# Patient Record
Sex: MALE | Race: WHITE | Employment: FULL TIME | URBAN - METROPOLITAN AREA
[De-identification: names, ages, dates, MRNs, and addresses within clinical notes are randomized per-mention and may not be internally consistent; named-entity substitution may affect disease eponyms.]

---

## 2020-09-11 ENCOUNTER — TRANSFERRED RECORDS (OUTPATIENT)
Dept: HEALTH INFORMATION MANAGEMENT | Facility: CLINIC | Age: 50
End: 2020-09-11

## 2020-09-11 LAB
CHOLEST SERPL-MCNC: 187 MG/DL (ref 135–200)
CREAT SERPL-MCNC: 1.34 MG/DL (ref 0.6–1.3)
GLUCOSE SERPL-MCNC: 136 MG/DL (ref 70–100)
HBA1C MFR BLD: 6.1 % (ref 3.8–5.6)
HDLC SERPL-MCNC: 40 MG/DL (ref 40–60)
LDLC SERPL CALC-MCNC: 116 MG/DL (ref 0–100)
POTASSIUM SERPL-SCNC: 3.8 MEQ/L (ref 3.5–5.1)
TRIGL SERPL-MCNC: 155 MG/DL (ref 0–150)

## 2020-09-29 ENCOUNTER — VIRTUAL VISIT (OUTPATIENT)
Dept: GASTROENTEROLOGY | Facility: CLINIC | Age: 50
End: 2020-09-29
Payer: COMMERCIAL

## 2020-09-29 DIAGNOSIS — K62.5 RECTAL BLEEDING: ICD-10-CM

## 2020-09-29 DIAGNOSIS — K59.00 CONSTIPATION, UNSPECIFIED CONSTIPATION TYPE: Primary | ICD-10-CM

## 2020-09-29 PROCEDURE — 99203 OFFICE O/P NEW LOW 30 MIN: CPT | Mod: 95 | Performed by: INTERNAL MEDICINE

## 2020-09-29 RX ORDER — NICOTINE POLACRILEX 4 MG/1
20 GUM, CHEWING ORAL DAILY
COMMUNITY

## 2020-09-29 RX ORDER — ZINC SULFATE 50(220)MG
220 CAPSULE ORAL DAILY
COMMUNITY

## 2020-09-29 RX ORDER — RIBOFLAVIN (VITAMIN B2) 100 MG
100 TABLET ORAL 3 TIMES DAILY
COMMUNITY

## 2020-09-29 RX ORDER — MULTIPLE VITAMINS W/ MINERALS TAB 9MG-400MCG
1 TAB ORAL DAILY
COMMUNITY

## 2020-09-29 RX ORDER — NAPROXEN 500 MG/1
500 TABLET ORAL 2 TIMES DAILY WITH MEALS
COMMUNITY

## 2020-09-29 RX ORDER — MONTELUKAST SODIUM 10 MG/1
10 TABLET ORAL AT BEDTIME
COMMUNITY

## 2020-09-29 NOTE — PROGRESS NOTES
"Ren Ruiz is a 49 year old male who is being evaluated via a billable telephone visit.      The patient has been notified of following:     \"This telephone visit will be conducted via a call between you and your physician/provider. We have found that certain health care needs can be provided without the need for a physical exam.  This service lets us provide the care you need with a short phone conversation.  If a prescription is necessary we can send it directly to your pharmacy.  If lab work is needed we can place an order for that and you can then stop by our lab to have the test done at a later time.    Telephone visits are billed at different rates depending on your insurance coverage. During this emergency period, for some insurers they may be billed the same as an in-person visit.  Please reach out to your insurance provider with any questions.    If during the course of the call the physician/provider feels a telephone visit is not appropriate, you will not be charged for this service.\"    Patient has given verbal consent for Telephone visit?  Yes    What phone number would you like to be contacted at? [phone]    Wife is conferencing in as well.     How would you like to obtain your AVS? Mail a copy    Phone call duration: 30 minutes    Nilson Titus MD      "

## 2020-09-29 NOTE — PROGRESS NOTES
"Visit Date:   09/29/2020      REQUESTING PHYSICIAN:  Dr. Dmitry Arias      REASON FOR CONSULTATION:  Change in bowel habit, rectal bleeding.      HISTORY OF PRESENT ILLNESS:  The patient is a 49-year-old man who previously lived and worked in the [place] area, but now lives in [place], [place], near the [place].  Over the past 10 years, the patient has struggled with bowel irregularity.  He states he has to drink coffee every day to effect a good bowel movement on a daily basis.  Without the benefit of coffee, he states 2 or 3 days might pass before a bowel movement would occur.  By that time, he would be uncomfortable with bloating, distention, a \"hard stomach,\" and difficult flatulence.  The flatulence also takes on an unpleasant sulfurous smell.  He also reports that he is rather sensitive to laxatives.  Even a single standard dose of MiraLax can provoke diarrhea.  Thus, he has found it difficult to modulate his bowel habit.  He has also noticed on occasion a small amount of bright red blood per rectum.  He has occasional abdominal cramping.  He denies nausea or vomiting, heartburn, dysphagia, anorexia or weight loss.  He denies fevers, sweats or chills.      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      The patient reports having had 2 colonoscopies, 1 in 2010 and another in 2012.  Apparently, there was a question of some type of \"colitis,\" though details are uncertain.  The patient did mention that his grandfather had ulcerative colitis.  To the best of his knowledge, there is no family history of colon cancer.      PAST MEDICAL HISTORY:  Asthma and seasonal allergies.      CURRENT MEDICATIONS:  Advair Diskus, Singulair, multivitamin, omeprazole, vitamin C and zinc supplements, " "Flonase, albuterol inhaler, ibuprofen.      MEDICATION ALLERGIES:  NONE.      FAMILY HISTORY:  Both parents are in good health; father age 70, mother age 68.  As mentioned, 1 grandfather had ulcerative colitis.  There is no family history of colorectal cancer.      SOCIAL HISTORY:  The patient is .  His wife is a nurse.  The patient does not smoke or drink to excess.  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      PHYSICAL EXAMINATION:  No exam was done today as this was a virtual visit done during the COVID pandemic.      ASSESSMENT:  Tendency towards constipation and rectal bleeding.  The latter is perhaps due to hemorrhoids, but given the past question of some type of \"colitis,\" that remains within the differential diagnosis.  I suspect the patient's episodes of constipation are leading to overgrowth of colon bacteria with noxious flatulence as a consequence.      PLAN:   1.  Discussed diagnostic considerations.   2.  Recommend proceeding with colonoscopy.  Discussed the technique, indications, risks, benefits, complications, etc., and the need for COVID testing.  This will be arranged accordingly.   3.  Further recommendations will follow.         MAHAD GROSS MD             D: 2020   T: 2020   MT: DB      Name:     SPENCER HESS   MRN:      [id number]        Account:      [id number]   :      1970           Visit Date:   2020      Document: [id number]       cc: Dmitry Arias MD   "

## 2020-09-30 ENCOUNTER — TELEPHONE (OUTPATIENT)
Dept: GASTROENTEROLOGY | Facility: CLINIC | Age: 50
End: 2020-09-30

## 2020-09-30 DIAGNOSIS — Z11.59 ENCOUNTER FOR SCREENING FOR OTHER VIRAL DISEASES: Primary | ICD-10-CM

## 2020-09-30 PROBLEM — K59.00 CONSTIPATION, UNSPECIFIED CONSTIPATION TYPE: Status: ACTIVE | Noted: 2020-09-30

## 2020-09-30 PROBLEM — K62.5 RECTAL BLEEDING: Status: ACTIVE | Noted: 2020-09-30

## 2020-09-30 NOTE — LETTER
[facility]  [address]  [address] FLOOR  [facility] [id number]  [phone]        September 30, 2020    Ren Ruiz  [address] [facility] [place] [address]

## 2020-09-30 NOTE — TELEPHONE ENCOUNTER
Date of colonoscopy/EGD: 10/21  Surgeon: Dr. Titus  Prep:Miralax  Packet:Colonoscopy/EGD instructions mailed to patient's home address.   Date: 9/30/2020      Surgery Scheduler

## 2020-09-30 NOTE — TELEPHONE ENCOUNTER
Left message for patient to return call to schedule EGD/colonoscopy. If Louisa or Sarah are not available, please transfer to same day surgery

## 2020-09-30 NOTE — LETTER

## 2020-09-30 NOTE — TELEPHONE ENCOUNTER
[place] of [place]. He will having his covid test done in [place]. He was given the OR [employer] Fax # to send those results to.

## 2020-10-21 ENCOUNTER — SURGERY (OUTPATIENT)
Age: 50
End: 2020-10-21
Payer: COMMERCIAL

## 2020-10-21 ENCOUNTER — HOSPITAL ENCOUNTER (OUTPATIENT)
Facility: CLINIC | Age: 50
Discharge: HOME | End: 2020-10-21
Attending: INTERNAL MEDICINE | Admitting: INTERNAL MEDICINE
Payer: COMMERCIAL

## 2020-10-21 VITALS
DIASTOLIC BLOOD PRESSURE: 70 MMHG | RESPIRATION RATE: 16 BRPM | HEART RATE: 63 BPM | SYSTOLIC BLOOD PRESSURE: 107 MMHG | OXYGEN SATURATION: 96 % | TEMPERATURE: 98 F

## 2020-10-21 DIAGNOSIS — K59.00 CONSTIPATION, UNSPECIFIED CONSTIPATION TYPE: ICD-10-CM

## 2020-10-21 DIAGNOSIS — K62.5 RECTAL BLEEDING: ICD-10-CM

## 2020-10-21 LAB — COLONOSCOPY: NORMAL

## 2020-10-21 PROCEDURE — 250N000009 HC RX 250: Performed by: INTERNAL MEDICINE

## 2020-10-21 PROCEDURE — G0500 MOD SEDAT ENDO SERVICE >5YRS: HCPCS | Performed by: INTERNAL MEDICINE

## 2020-10-21 PROCEDURE — 45378 DIAGNOSTIC COLONOSCOPY: CPT | Performed by: INTERNAL MEDICINE

## 2020-10-21 PROCEDURE — 250N000011 HC RX IP 250 OP 636: Performed by: INTERNAL MEDICINE

## 2020-10-21 RX ORDER — ONDANSETRON 2 MG/ML
4 INJECTION INTRAMUSCULAR; INTRAVENOUS
Status: DISCONTINUED | OUTPATIENT
Start: 2020-10-21 | End: 2020-10-21 | Stop reason: HOSPADM

## 2020-10-21 RX ORDER — LIDOCAINE 40 MG/G
CREAM TOPICAL
Status: DISCONTINUED | OUTPATIENT
Start: 2020-10-21 | End: 2020-10-21 | Stop reason: HOSPADM

## 2020-10-21 RX ORDER — FLUMAZENIL 0.1 MG/ML
0.2 INJECTION, SOLUTION INTRAVENOUS
Status: DISCONTINUED | OUTPATIENT
Start: 2020-10-21 | End: 2020-10-21 | Stop reason: HOSPADM

## 2020-10-21 RX ORDER — FENTANYL CITRATE 50 UG/ML
INJECTION, SOLUTION INTRAMUSCULAR; INTRAVENOUS PRN
Status: DISCONTINUED | OUTPATIENT
Start: 2020-10-21 | End: 2020-10-21 | Stop reason: HOSPADM

## 2020-10-21 RX ORDER — ONDANSETRON 4 MG/1
4 TABLET, ORALLY DISINTEGRATING ORAL EVERY 6 HOURS PRN
Status: DISCONTINUED | OUTPATIENT
Start: 2020-10-21 | End: 2020-10-21 | Stop reason: HOSPADM

## 2020-10-21 RX ORDER — NALOXONE HYDROCHLORIDE 0.4 MG/ML
.1-.4 INJECTION, SOLUTION INTRAMUSCULAR; INTRAVENOUS; SUBCUTANEOUS
Status: DISCONTINUED | OUTPATIENT
Start: 2020-10-21 | End: 2020-10-21 | Stop reason: HOSPADM

## 2020-10-21 RX ORDER — PROCHLORPERAZINE MALEATE 5 MG
10 TABLET ORAL EVERY 6 HOURS PRN
Status: DISCONTINUED | OUTPATIENT
Start: 2020-10-21 | End: 2020-10-21 | Stop reason: HOSPADM

## 2020-10-21 RX ORDER — ONDANSETRON 2 MG/ML
4 INJECTION INTRAMUSCULAR; INTRAVENOUS EVERY 6 HOURS PRN
Status: DISCONTINUED | OUTPATIENT
Start: 2020-10-21 | End: 2020-10-21 | Stop reason: HOSPADM

## 2020-10-21 RX ADMIN — LIDOCAINE HYDROCHLORIDE 1 ML: 10 INJECTION, SOLUTION EPIDURAL; INFILTRATION; INTRACAUDAL; PERINEURAL at 09:33

## 2020-10-21 RX ADMIN — MIDAZOLAM 2 MG: 1 INJECTION INTRAMUSCULAR; INTRAVENOUS at 10:11

## 2020-10-21 RX ADMIN — MIDAZOLAM 1 MG: 1 INJECTION INTRAMUSCULAR; INTRAVENOUS at 10:10

## 2020-10-21 RX ADMIN — FENTANYL CITRATE 50 MCG: 50 INJECTION, SOLUTION INTRAMUSCULAR; INTRAVENOUS at 10:09

## 2020-10-21 RX ADMIN — MIDAZOLAM 2 MG: 1 INJECTION INTRAMUSCULAR; INTRAVENOUS at 10:09

## 2020-10-21 NOTE — CONSULTS
[facility] GI Pre-Procedure Physical Assessment    Ren Ruiz MRN# [id number]   Age: 49 year old YOB: 1970      Date of Surgery: 10/21/2020  Location [facility]      Date of Exam 10/21/2020 Facility (Same day)       Home clinic: [facility]  Primary care provider: Ad Ragsdale         Active problem list:   Patient Active Problem List   Diagnosis     Mild intermittent asthma     Allergic rhinitis due to other allergen     Polyp of nasal cavity     Joint pain     Tobacco use disorder     Constipation, unspecified constipation type     Rectal bleeding            Medications (include herbals and vitamins):   Any Plavix use in the last 7 days?  No     Current Facility-Administered Medications   Medication     lidocaine (LMX4) kit     lidocaine 1 % 0.1-1 mL     ondansetron (ZOFRAN) injection 4 mg     sodium chloride (PF) 0.9% PF flush 3 mL     sodium chloride (PF) 0.9% PF flush 3 mL             Allergies:      Allergies   Allergen Reactions     Animal Dander      Mold      Nuts      Ragweeds      Allergy to Latex?  No  Allergy to tape?    No          Social History:     Social History     Tobacco Use     Smoking status: Former Smoker     Years: 12.00     Quit date: 2009     Years since quittin.2   Substance Use Topics     Alcohol use: Yes     Alcohol/week: 0.4 standard drinks     Comment: rarely            Physical Exam:   All vitals have been reviewed  Blood pressure 134/79, temperature 98  F (36.7  C), temperature source Oral, resp. rate 18.  Airway assessment:   Patient is able to open mouth wide  Patient is able to stick out tongue      Lungs:   No increased work of breathing, good air exchange, clear to auscultation bilaterally, no crackles or wheezing      Cardiovascular:   Normal apical impulse, regular rate and rhythm, normal S1 and S2, no S3 or S4, and no murmur noted           Lab / Radiology Results:   All laboratory data reviewed           Assessment:   Appropriately NPO  Chief complaint or anatomic assessment of involved area: change in bowel habit, rectal bleeding         Plan:   Moderate (conscious) sedation     Patient's active problems diagnostically and therapeutically optimized for the planned procedure  Risks, benefits, alternatives to sedation and blood explained and consent obtained  Risks, benefits, alternatives to procedure explained and consent obtained  Orders and progress notes are in the chart  Discharge from Phase 1 and / or Phase 2 recovery when patient meets criteria    I have reviewed the history and physical, lab finding(s), diagnostic data, medicaitons, and the plan for sedation.  I have determined this patient to be an appropriate candidate for the planned sedation / procedure and have reassessed the patient immediately prior to sedation / procedure.    I have personally and medically directed the administration of medications used.    Nilson Titus MD

## 2020-10-21 NOTE — DISCHARGE INSTRUCTIONS
[facility]    Home Care Following Endoscopy          Activity:    You have just undergone an endoscopic procedure usually performed with conscious sedation.  Do not work or operate machinery (including a car) for at least 12 hours.      I encourage you to walk and attempt to pass this air as soon as possible.    Diet:    Return to the diet you were on before your procedure but eat lightly for the first 12-24 hours.    Drink plenty of water.    Resume any regular medications unless otherwise advised by your physician.  Please begin any new medication prescribed as a result of your procedure as directed by your physician.     If you had any biopsy or polyp removed please refrain from aspirin or aspirin products for 2 days.  If on Coumadin please restart as instructed by your physician.   Pain:    You may take Tylenol as needed for pain.  Expected Recovery:    You can expect some mild abdominal fullness and/or discomfort due to the air used to inflate your intestinal tract.     Call Your Physician if You Have:  You had a clean colon.  See your procedure report.    After Colonoscopy:  o Worsening persisting abdominal pain which is worse with activity.  o Fevers (>101 degrees F), chills or shakes.  o Passage of continued blood with bowel movements.   Any questions or concerns about your recovery, please call [phone] or after hours [phone] Nurse Advice Line.    Follow-up Care:    You should receive a call or letter with your results within 1 week. Please call if you have not received a notification of your results.  If asked to return to clinic please make an appointment 1 week after your procedure.  Call [phone].

## 2020-11-07 ENCOUNTER — HEALTH MAINTENANCE LETTER (OUTPATIENT)
Age: 50
End: 2020-11-07

## 2021-09-11 ENCOUNTER — HEALTH MAINTENANCE LETTER (OUTPATIENT)
Age: 51
End: 2021-09-11

## 2022-01-01 ENCOUNTER — HEALTH MAINTENANCE LETTER (OUTPATIENT)
Age: 52
End: 2022-01-01

## 2022-10-29 ENCOUNTER — HEALTH MAINTENANCE LETTER (OUTPATIENT)
Age: 52
End: 2022-10-29

## 2023-04-08 ENCOUNTER — HEALTH MAINTENANCE LETTER (OUTPATIENT)
Age: 53
End: 2023-04-08

## (undated) RX ORDER — FENTANYL CITRATE 50 UG/ML
INJECTION, SOLUTION INTRAMUSCULAR; INTRAVENOUS
Status: DISPENSED
Start: 2020-10-21